# Patient Record
Sex: FEMALE | Race: ASIAN | Employment: FULL TIME | URBAN - METROPOLITAN AREA
[De-identification: names, ages, dates, MRNs, and addresses within clinical notes are randomized per-mention and may not be internally consistent; named-entity substitution may affect disease eponyms.]

---

## 2018-09-02 ENCOUNTER — APPOINTMENT (OUTPATIENT)
Dept: GENERAL RADIOLOGY | Facility: CLINIC | Age: 33
End: 2018-09-02
Attending: EMERGENCY MEDICINE
Payer: COMMERCIAL

## 2018-09-02 ENCOUNTER — HOSPITAL ENCOUNTER (EMERGENCY)
Facility: CLINIC | Age: 33
Discharge: HOME | End: 2018-09-03
Attending: EMERGENCY MEDICINE | Admitting: EMERGENCY MEDICINE
Payer: COMMERCIAL

## 2018-09-02 VITALS
OXYGEN SATURATION: 95 % | DIASTOLIC BLOOD PRESSURE: 82 MMHG | WEIGHT: 210 LBS | TEMPERATURE: 97.5 F | SYSTOLIC BLOOD PRESSURE: 131 MMHG | HEART RATE: 103 BPM | RESPIRATION RATE: 20 BRPM
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
## 2018-09-02 DIAGNOSIS — S80.01XA CONTUSION OF RIGHT KNEE, INITIAL ENCOUNTER: ICD-10-CM

## 2018-09-02 DIAGNOSIS — S93.491A SPRAIN OF ANTERIOR TALOFIBULAR LIGAMENT OF RIGHT ANKLE, INITIAL ENCOUNTER: ICD-10-CM

## 2018-09-02 DIAGNOSIS — S80.02XA CONTUSION OF LEFT KNEE, INITIAL ENCOUNTER: ICD-10-CM

## 2018-09-02 PROCEDURE — 25000132 ZZH RX MED GY IP 250 OP 250 PS 637: Performed by: EMERGENCY MEDICINE

## 2018-09-02 PROCEDURE — 29515 APPLICATION SHORT LEG SPLINT: CPT

## 2018-09-02 PROCEDURE — 73610 X-RAY EXAM OF ANKLE: CPT | Mod: RT

## 2018-09-02 PROCEDURE — 99284 EMERGENCY DEPT VISIT MOD MDM: CPT | Mod: 25

## 2018-09-02 PROCEDURE — 73630 X-RAY EXAM OF FOOT: CPT | Mod: RT

## 2018-09-02 RX ORDER — IBUPROFEN 800 MG/1
800 TABLET, FILM COATED ORAL ONCE
Status: COMPLETED | OUTPATIENT
Start: 2018-09-02 | End: 2018-09-02

## 2018-09-02 RX ORDER — CETIRIZINE HYDROCHLORIDE 10 MG/1
10 TABLET ORAL DAILY
COMMUNITY

## 2018-09-02 RX ADMIN — IBUPROFEN 800 MG: 800 TABLET, FILM COATED ORAL at 23:45

## 2018-09-02 NOTE — ED AVS SNAPSHOT
[facility] Emergency Department    [address]    [facility] [address]    Phone:  [phone]    Fax:  [phone]                                       Velma Gruber   MRN: [id number]    Department:  [facility] Emergency Department   Date of Visit:  9/2/2018           Patient Information     Date Of Birth          1985        Your diagnoses for this visit were:     Fall down steps, initial encounter     Contusion of left knee, initial encounter     Contusion of right knee, initial encounter     Sprain of anterior talofibular ligament of right ankle, initial encounter        You were seen by Giram Ayoub MD.      Follow-up Information     Follow up with [facility], [facility].    Why:  As needed    Contact information:    [address] [facility] [address]  [phone]          Discharge Instructions       Discharge Instructions  Ankle Sprain    An ankle sprain is a stretching or tearing of a ligament around your ankle joint. In most cases, we recommend resting the ankle for about 3 days, followed by return to activity. Some severe sprains need longer periods of rest, or can require a cast or boot to immobilize them.    Generally, every Emergency Department visit should have a follow-up clinic visit with either a primary or a specialty clinic/provider. Please follow-up as instructed by your emergency provider today.    Return to the Emergency Department if:    Your pain is much worse, or if there is pain in a new area.    Your foot or leg becomes pale, cool, blue, or numb or tingling.    There is anything concerning to you about how your ankle looks.    Any splint or device is feeling too tight, causing pain, or rubbing into your skin.    Follow-up with your provider:    As recommended by your emergency provider.    If your ankle is not back to normal within about 1 week.    If you are involved in significant athletic activities.         Treatment:    Apply ice your injured area for 15 minutes at a time, at least 3 times a day for the first 1-2 days. Use a cloth between the ice bag and your skin to prevent frostbite.     Do not sleep with an ice pack or heating pad on, since this can cause burns or skin injury.    Raise the injured area above the level of your heart as much as possible in the first 1-2 days.    Pain medications -- You may take a pain medication such as Tylenol  (acetaminophen), Advil , Nuprin  (ibuprofen) or Aleve  (naproxen).    Splint. We often give a stirrup-shaped ankle splint to support your ankle and prevent it from turning again. Wear this all the time for the first 3-5 days, and then as directed by your provider.    Crutches. If you cannot put weight on the ankle without a lot of pain, we recommend crutches. You can put as much weight on the ankle as possible without severe pain.     Compression. An elastic bandage (Ace  wrap) can help with pain and swelling. Remove this at least twice a day, and leave it off for several hours if you develop swelling of the foot.     Exercises.  Movements, like rotating the foot in circles, should be started when swelling improves.   If you were given a prescription for medicine here today, be sure to read all of the information (including the package insert) that comes with your prescription.  This will include important information about the medicine, its side effects, and any warnings that you need to know about.  The pharmacist who fills the prescription can provide more information and answer questions you may have about the medicine.  If you have questions or concerns that the pharmacist cannot address, please call or return to the Emergency Department.  Remember that you can always come back to the Emergency Department if you are not able to see your regular provider in the amount of time listed above, if you get any new symptoms, or if there is anything that worries you.      24  Hour Appointment Hotline       To make an appointment at any [facility], call [phone] ([phone]). If you don't have a family doctor or clinic, we will help you find one. [facility] clinics are conveniently located to serve the needs of you and your family.             Review of your medicines      Our records show that you are taking the medicines listed below. If these are incorrect, please call your family doctor or clinic.        Dose / Directions Last dose taken    CELEBREX PO   Dose:  50 mg        Take 50 mg by mouth daily   Refills:  0        cetirizine 10 MG tablet   Commonly known as:  zyrTEC   Dose:  10 mg        Take 10 mg by mouth daily   Refills:  0        SYNTHROID PO   Dose:  37.5 mcg        Take 37.5 mcg by mouth daily   Refills:  0                Procedures and tests performed during your visit     Ankle XR, G/E 3 views, right    Foot  XR, G/E 3 views, right    XR Knee Bilateral 1/2 Views      Orders Needing Specimen Collection     None      Pending Results     Date and Time Order Name Status Description    9/2/2018 2340 Ankle XR, G/E 3 views, right Preliminary             Pending Culture Results     No orders found for last 3 day(s).            Pending Results Instructions     If you had any lab results that were not finalized at the time of your Discharge, you can call the ED Lab Result RN at [phone]. You will be contacted by this team for any positive Lab results or changes in treatment. The nurses are available 7 days a week from 10A to 6:30P.  You can leave a message 24 hours per day and they will return your call.        Test Results From Your Hospital Stay        9/3/2018 12:48 AM      Narrative     XR KNEE BILATERAL 1-2 VIEWS   9/3/2018 12:03 AM     INDICATION: Fall.    COMPARISON: None.        Impression     IMPRESSION: No fracture, dislocation or other acute findings.    YAZMIN CAREY MD         9/3/2018 12:19 AM      Narrative     XR ANKLE RIGHT GREATER THAN 3  VIEWS   9/3/2018 12:03 AM     INDICATION: Fall.    COMPARISON: None.        Impression     IMPRESSION: Soft tissue swelling about the ankle. No acute fracture or  dislocation.         9/3/2018 12:48 AM      Narrative     XR FOOT RIGHT GREATER THAN 3 VIEWS   9/3/2018 12:03 AM     INDICATION: Fall.    COMPARISON: None.        Impression     IMPRESSION: No fracture or other acute findings.    YAZMIN CAREY MD                Clinical Quality Measure: Blood Pressure Screening     Your blood pressure was checked while you were in the emergency department today. The last reading we obtained was  BP: 131/82 . Please read the guidelines below about what these numbers mean and what you should do about them.  If your systolic blood pressure (the top number) is less than 120 and your diastolic blood pressure (the bottom number) is less than 80, then your blood pressure is normal. There is nothing more that you need to do about it.  If your systolic blood pressure (the top number) is 120-139 or your diastolic blood pressure (the bottom number) is 80-89, your blood pressure may be higher than it should be. You should have your blood pressure rechecked within a year by a primary care provider.  If your systolic blood pressure (the top number) is 140 or greater or your diastolic blood pressure (the bottom number) is 90 or greater, you may have high blood pressure. High blood pressure is treatable, but if left untreated over time it can put you at risk for heart attack, stroke, or kidney failure. You should have your blood pressure rechecked by a primary care provider within the next 4 weeks.  If your provider in the emergency department today gave you specific instructions to follow-up with your doctor or provider even sooner than that, you should follow that instruction and not wait for up to 4 weeks for your follow-up visit.        Thank you for choosing [facility]       Thank you for choosing [facility] for your care. Our goal  "is always to provide you with excellent care. Hearing back from our patients is one way we can continue to improve our services. Please take a few minutes to complete the written survey that you may receive in the mail after you visit with us. Thank you!        [employer] Information     [employer] lets you send messages to your doctor, view your test results, renew your prescriptions, schedule appointments and more. To sign up, go to [email or web address] . Click on \"Log in\" on the left side of the screen, which will take you to the Welcome page. Then click on \"Sign up Now\" on the right side of the page.     You will be asked to enter the access code listed below, as well as some personal information. Please follow the directions to create your username and password.     Your access code is: [email or web address]  Expires: 2018 12:33 AM     Your access code will  in 90 days. If you need help or a new code, please call your [facility] clinic or [phone].        Care EveryWhere ID     This is your Care EveryWhere ID. This could be used by other organizations to access your [facility] medical records  [id number]        Equal Access to Services     DANIELA BARRETT : Marilu Manley, waagustin wilcox, zuly carvalho, sandra phillips. So [facility] [phone].    ATENCIÓN: Si habla español, tiene a yuen disposición servicios gratuitos de asistencia lingüística. Ross al [phone].    We comply with applicable federal civil rights laws and [place] laws. We do not discriminate on the basis of race, color, national origin, age, disability, sex, sexual orientation, or gender identity.            After Visit Summary       This is your record. Keep this with you and show to your community pharmacist(s) and doctor(s) at your next visit.                  "

## 2018-09-02 NOTE — ED AVS SNAPSHOT
[facility] Emergency Department    [address]    [facility] [address]    Phone:  [phone]    Fax:  [phone]                                       Velma Gruber   MRN: [id number]    Department:  [facility] Emergency Department   Date of Visit:  9/2/2018           After Visit Summary Signature Page     I have received my discharge instructions, and my questions have been answered. I have discussed any challenges I see with this plan with the nurse or doctor.    ..........................................................................................................................................  Patient/Patient Representative Signature      ..........................................................................................................................................  Patient Representative Print Name and Relationship to Patient    ..................................................               ................................................  Date                                            Time    ..........................................................................................................................................  Reviewed by Signature/Title    ...................................................              ..............................................  Date                                                            Time          22EPIC Rev 08/18

## 2018-09-03 ENCOUNTER — APPOINTMENT (OUTPATIENT)
Dept: GENERAL RADIOLOGY | Facility: CLINIC | Age: 33
End: 2018-09-03
Attending: EMERGENCY MEDICINE
Payer: COMMERCIAL

## 2018-09-03 PROCEDURE — 73560 X-RAY EXAM OF KNEE 1 OR 2: CPT | Mod: 50

## 2018-09-03 NOTE — ED PROVIDER NOTES
Visit Date:   09/02/2018      CHIEF COMPLAINT:  Fall down the steps.      HISTORY OF PRESENT ILLNESS:  This is a pleasant 33-year-old female who presents after slipping on the steps.  She landed twisting her right ankle inward as well as landing directly on both knees.  She sustained no other trauma or head injury.  She has not been able to weightbear since the time of the injury.  This occurred just prior to arrival.      PAST MEDICAL HISTORY:   1.  Anxiety.   2.  Asthma.   3.  Environmental allergies.   4.  History of ovarian cyst.   5.  Thyroid cancer.      PAST SURGICAL HISTORY:  Thyroidectomy.      MEDICATIONS:     1.  Synthroid.   2.  Celexa.   3.  Zyrtec.      ALLERGIES:  None.      SOCIAL HISTORY:  The patient presents with her significant other.  She does not smoke.      REVIEW OF SYSTEMS:  A pertinent 10-point review of systems is negative except for that noted in the HPI.      PHYSICAL EXAMINATION:   GENERAL:  The patient is sitting up comfortably in bed with an ice pack on her right ankle.   VITAL SIGNS:  Blood pressure 131/82, heart rate 103, respiratory rate 20, oxygen 95% on room air, temperature 97.5.   HEENT:  Atraumatic.   NECK:  The patient is freely moving her neck.   CARDIOVASCULAR:  Regular rhythm.  Normal rate.  No murmurs.  Two plus DP pulse on the right foot.   RESPIRATORY:  Clear to auscultation bilaterally.  No increased work of breathing.   GASTROINTESTINAL:  Soft, nontender.   MUSCULOSKELETAL:  Full range of motion of the left lower extremity and bilateral upper extremities.  Full range of motion of the right hip and right knee.  She has large soft tissue swelling over the lateral malleolus and pain with any range of motion of the right ankle.  There is tenderness in the mid foot.   SKIN:  The patient has contusions to both anterior knees.  Otherwise, no pertinent skin findings.   NEUROLOGIC:  The patient is alert, oriented x4.  GCS is 15.  Normal strength and sensation in all  extremities.   PSYCHIATRIC:  The patient has a normal mood and affect.      LABORATORY  EVALUATION AND DIAGNOSTIC STUDIES:    Plain x-rays of the bilateral knees, right foot and right ankle are negative for any acute fracture or bony injury.  There is soft tissue swelling noted about the ankle laterally.  No dislocation.      EMERGENCY DEPARTMENT COURSE AND MEDICAL DECISION MAKING:  This is a 33-year-old female who presents after a fall as described in the HPI.  She has an inversion injury of her right ankle consistent with lateral sprain.  No radiographic evidence of fracture or dislocation.  No other findings by history or exam that would necessitate further traumatic workup.      Plan of care will be supportive with rest, ice, ibuprofen, gel splinting and crutches used with appropriate teaching to the right ankle until she is able to weightbear without pain.  The patient is understanding of this and will be discharged home.      DIAGNOSES:     1.  Fall down steps   2.  Bilateral knee contusions.   3.  Right ankle sprain, acute.      PLAN OF CARE:  Discharge home.         MOSHE KOHLI MD             D: 2018   T: 2018   MT: NAV      Name:     [facility]   MRN:      [id number]        Account:      [id number]   :      1985           Visit Date:   2018      Document: [id number]

## 2018-09-03 NOTE — ED TRIAGE NOTES
Pt reports falling down three steps after missing a step and landing on her knees, c/o R ankle pain, swelling noted. Pt endorses hitting head, but denies headache. Not on thinners. ABCs intact, A/O x4.

## 2018-10-19 ENCOUNTER — THERAPY VISIT (OUTPATIENT)
Dept: PHYSICAL THERAPY | Facility: CLINIC | Age: 33
End: 2018-10-19
Payer: COMMERCIAL

## 2018-10-19 DIAGNOSIS — M25.571 PAIN IN JOINT INVOLVING ANKLE AND FOOT, RIGHT: Primary | ICD-10-CM

## 2018-10-19 PROCEDURE — 97010 HOT OR COLD PACKS THERAPY: CPT | Mod: GP | Performed by: PHYSICAL THERAPIST

## 2018-10-19 PROCEDURE — 97110 THERAPEUTIC EXERCISES: CPT | Mod: GP | Performed by: PHYSICAL THERAPIST

## 2018-10-19 PROCEDURE — 97161 PT EVAL LOW COMPLEX 20 MIN: CPT | Mod: GP | Performed by: PHYSICAL THERAPIST

## 2018-10-19 NOTE — PROGRESS NOTES
[facility] Initial Evaluation  Subjective:  Patient is a 33 year old female presenting with rehab right ankle/foot hpi. The history is provided by the patient.   Velma Gruber is a 33 year old female with a right ankle (right ankle instability, swelling and pain) condition.      This is a new condition  Patient has chief complaint of right ankle pain, instability and swelling after she fell on the stairs on September 2, 2018. She felt a sudden pop and had immediate swelling and pain. She had an xray which showed no fractures.Due to continued swelling, pain and instability she returned to [facility] for follow up a few weeks later. She had another xray which was negative for fracture. History of right ankle sprain in high school.  Goals: improved stability; return to walk/run program pain free.    Patient reports pain:  Lateral and joint.  Radiates to:  No radiation.  Pain is described as aching and stabbing and is intermittent and reported as 1/10.  Associated symptoms:  Buckling/giving out, edema, loss of motion/stiffness and loss of strength. Pain is worse in the P.M..  Symptoms are exacerbated by walking, activity, ascending stairs, bending/squatting, descending stairs and standing and relieved by ice.  Since onset symptoms are gradually improving.  Special tests:  X-ray (negative).  Previous treatment includes chiropractic.  There was mild improvement following previous treatment.  General health as reported by patient is fair.  Pertinent medical history includes:  Overweight, thyroid problems, migraines/headaches, concussions/dizziness and other (Hoshimoto disease).    Other surgeries include:  Other (Thyroidectomy 4/2017).  Current medications:  Hormone replacement therapy, thyroid medication, sleep medication and anti-inflammatory (Celebrex;allergy meds).  Current occupation -[employer].  Patient is working in normal job without restrictions.  Primary job tasks include:  Prolonged  sitting.    Barriers include:  None as reported by patient.    Red flags:  None as reported by patient.                        Objective:    Gait:    Gait Type:  Normal   Assistive Devices:  None            Ankle/Foot Evaluation  ROM:    AROM:    Dorsiflexion: Left:    Right:   3  Plantarflexion: Left:     Right:  55  Inversion: Left:      Right:  40  Eversion:     Right:  30  Great toe flexion: Left:      Right:  WNL  Great Toe Extension: Left:      Right: WNL    Strength:    Dorsiflexion:  Right: 4+/5    Pain:-  Plantarflexion: Right: 3/5   Pain:++  Inversion:Right: 4/5   Pain:-/+  Eversion:Right: 4/5   Pain:-/+                  LIGAMENT TESTING:         Valgus Stress (Deltoid) Right: Gr II  Rotation (Deltoid) Right: Gr II  External Rotation (High Ankle) Right: neg    PALPATION:     Right ankle tenderness present at:   deltoid ligament and lateral malleolus  Right ankle tenderness not present at:  achilles tendon or plantar fascia  EDEMA: Edema ankle: moderate right ankle.                                                              General     ROS    Assessment/Plan:    Patient is a 33 year old female with right side ankle complaints.    Patient has the following significant findings with corresponding treatment plan.                Diagnosis 1:  Right ankle sprain  Pain -  hot/cold therapy and education  Decreased ROM/flexibility - manual therapy, therapeutic exercise and home program  Decreased strength - therapeutic exercise, therapeutic activities and home program  Edema - cryocuff and self management/home program  Instability -  Therapeutic Activity  Therapeutic Exercise  home program    Therapy Evaluation Codes:   1) History comprised of:   Personal factors that impact the plan of care:      None.    Comorbidity factors that impact the plan of care are:      Hoshimotos disease with joint pain and None.     Medications impacting care: None.  2) Examination of Body Systems comprised of:   Body structures and  functions that impact the plan of care:      Ankle.   Activity limitations that impact the plan of care are:      Running, Squatting/kneeling, Stairs and Walking.  3) Clinical presentation characteristics are:   Stable/Uncomplicated.  4) Decision-Making    Low complexity using standardized patient assessment instrument and/or measureable assessment of functional outcome.  Cumulative Therapy Evaluation is: Low complexity.    Previous and current functional limitations:  (See Goal Flow Sheet for this information)    Short term and Long term goals: (See Goal Flow Sheet for this information)     Communication ability:  Patient appears to be able to clearly communicate and understand verbal and written communication and follow directions correctly.  Treatment Explanation - The following has been discussed with the patient:   RX ordered/plan of care  Anticipated outcomes  Possible risks and side effects  This patient would benefit from PT intervention to resume normal activities.   Rehab potential is excellent.    Frequency:  1 X week, once daily  Duration:  for 6 weeks  Discharge Plan:  Achieve all LTG.  Independent in home treatment program.  Reach maximal therapeutic benefit.    Please refer to the daily flowsheet for treatment today, total treatment time and time spent performing 1:1 timed codes.

## 2018-10-19 NOTE — MR AVS SNAPSHOT
"              After Visit Summary   10/19/2018    Velma Gruber    MRN: [id number]           Patient Information     Date Of Birth          1985        Visit Information        Provider Department      10/19/2018 7:30 AM Kristina Scherer PT [facility] [place]        Today's Diagnoses     Pain in joint involving ankle and foot, right    -  1       Follow-ups after your visit        Your next 10 appointments already scheduled     Oct 23, 2018  9:00 AM CDT   MICHELET Extremity with Davis Yanes PT   [facility] [place] (MICHE[facility] [place])    [address] [facility] [address] [phone]              Who to contact     If you have questions or need follow up information about today's clinic visit or your schedule please contact [facility] directly at [phone].  Normal or non-critical lab and imaging results will be communicated to you by MyChart, letter or phone within 4 business days after the clinic has received the results. If you do not hear from us within 7 days, please contact the clinic through MyChart or phone. If you have a critical or abnormal lab result, we will notify you by phone as soon as possible.  Submit refill requests through [employer] or call your pharmacy and they will forward the refill request to us. Please allow 3 business days for your refill to be completed.          Additional Information About Your Visit        MyChart Information     [email or web address] lets you send messages to your doctor, view your test results, renew your prescriptions, schedule appointments and more. To sign up, go to [email or web address] . Click on \"Log in\" on the left side of the screen, which will take you to the Welcome page. Then click on \"Sign up Now\" on the right side of the page.     You will be asked to enter the access code listed below, as well as some personal information. Please follow the directions to create your username and " password.     Your access code is: [email or web address]  Expires: 2018 12:33 AM     Your access code will  in 90 days. If you need help or a new code, please call your [facility] or [phone].        Care EveryWhere ID     This is your Care EveryWhere ID. This could be used by other organizations to access your [facility] medical records  [id number]         Blood Pressure from Last 3 Encounters:   18 131/82   10/04/14 109/74    Weight from Last 3 Encounters:   18 95.3 kg (210 lb)              We Performed the Following     HC PT EVAL, LOW COMPLEXITY     HOT OR COLD PACKS THERAPY     MICHELET INITIAL EVAL REPORT     THERAPEUTIC EXERCISES        Primary Care Provider Office Phone # Fax #    [facility] [phone] [address] [facility] [address]        Equal Access to Services     DANIELA BARRETT : Hadii aad ku hadasho Soomaali, waaxda luqadaha, qaybta kaalmada adeegyada, waxay idiin hayaan adeeg kharagio lylen . So [facility] [phone].    ATENCIÓN: Si habla español, tiene a yuen disposición servicios gratuitos de asistencia lingüística. Ross al [phone].    We comply with applicable federal civil rights laws and [place] laws. We do not discriminate on the basis of race, color, national origin, age, disability, sex, sexual orientation, or gender identity.            Thank you!     [facility]. Our goal is always to provide you with excellent care. Hearing back from our patients is one way we can continue to improve our services. Please take a few minutes to complete the written survey that you may receive in the mail after your visit with us. Thank you!             Your Updated Medication List - Protect others around you: Learn how to safely use, store and throw away your medicines at www.disposemymeds.org.          This list is accurate as of 10/19/18  9:09 AM.  Always use your most  recent med list.                   Brand Name Dispense Instructions for use Diagnosis    CELEBREX PO      Take 50 mg by mouth daily        cetirizine 10 MG tablet    zyrTEC     Take 10 mg by mouth daily        SYNTHROID PO      Take 37.5 mcg by mouth daily

## 2018-10-23 ENCOUNTER — THERAPY VISIT (OUTPATIENT)
Dept: PHYSICAL THERAPY | Facility: CLINIC | Age: 33
End: 2018-10-23
Payer: COMMERCIAL

## 2018-10-23 DIAGNOSIS — M25.571 PAIN IN JOINT INVOLVING ANKLE AND FOOT, RIGHT: ICD-10-CM

## 2018-10-23 PROCEDURE — 97110 THERAPEUTIC EXERCISES: CPT | Mod: GP | Performed by: PHYSICAL THERAPIST

## 2018-10-23 PROCEDURE — 97016 VASOPNEUMATIC DEVICE THERAPY: CPT | Mod: GP | Performed by: PHYSICAL THERAPIST

## 2018-10-23 PROCEDURE — 97140 MANUAL THERAPY 1/> REGIONS: CPT | Mod: GP | Performed by: PHYSICAL THERAPIST

## 2018-11-01 ENCOUNTER — THERAPY VISIT (OUTPATIENT)
Dept: PHYSICAL THERAPY | Facility: CLINIC | Age: 33
End: 2018-11-01
Payer: COMMERCIAL

## 2018-11-01 DIAGNOSIS — M25.571 PAIN IN JOINT INVOLVING ANKLE AND FOOT, RIGHT: ICD-10-CM

## 2018-11-01 PROCEDURE — 97110 THERAPEUTIC EXERCISES: CPT | Mod: GP | Performed by: PHYSICAL THERAPIST

## 2018-11-01 PROCEDURE — 97140 MANUAL THERAPY 1/> REGIONS: CPT | Mod: GP | Performed by: PHYSICAL THERAPIST

## 2018-11-01 PROCEDURE — 97016 VASOPNEUMATIC DEVICE THERAPY: CPT | Mod: GP | Performed by: PHYSICAL THERAPIST

## 2018-11-08 ENCOUNTER — THERAPY VISIT (OUTPATIENT)
Dept: PHYSICAL THERAPY | Facility: CLINIC | Age: 33
End: 2018-11-08
Payer: COMMERCIAL

## 2018-11-08 DIAGNOSIS — M25.571 PAIN IN JOINT INVOLVING ANKLE AND FOOT, RIGHT: ICD-10-CM

## 2018-11-08 PROCEDURE — 97016 VASOPNEUMATIC DEVICE THERAPY: CPT | Mod: GP | Performed by: PHYSICAL THERAPIST

## 2018-11-08 PROCEDURE — 97110 THERAPEUTIC EXERCISES: CPT | Mod: GP | Performed by: PHYSICAL THERAPIST

## 2018-11-08 PROCEDURE — 97140 MANUAL THERAPY 1/> REGIONS: CPT | Mod: GP | Performed by: PHYSICAL THERAPIST

## 2018-11-11 ENCOUNTER — HEALTH MAINTENANCE LETTER (OUTPATIENT)
Age: 33
End: 2018-11-11

## 2018-11-20 ENCOUNTER — THERAPY VISIT (OUTPATIENT)
Dept: PHYSICAL THERAPY | Facility: CLINIC | Age: 33
End: 2018-11-20
Payer: COMMERCIAL

## 2018-11-20 DIAGNOSIS — M25.571 PAIN IN JOINT INVOLVING ANKLE AND FOOT, RIGHT: ICD-10-CM

## 2018-11-20 PROCEDURE — 97110 THERAPEUTIC EXERCISES: CPT | Mod: GP | Performed by: PHYSICAL THERAPIST

## 2018-11-20 PROCEDURE — 97140 MANUAL THERAPY 1/> REGIONS: CPT | Mod: GP | Performed by: PHYSICAL THERAPIST

## 2018-12-06 ENCOUNTER — THERAPY VISIT (OUTPATIENT)
Dept: PHYSICAL THERAPY | Facility: CLINIC | Age: 33
End: 2018-12-06
Payer: COMMERCIAL

## 2018-12-06 DIAGNOSIS — M25.571 PAIN IN JOINT INVOLVING ANKLE AND FOOT, RIGHT: ICD-10-CM

## 2018-12-06 PROCEDURE — 97110 THERAPEUTIC EXERCISES: CPT | Mod: GP | Performed by: PHYSICAL THERAPIST

## 2018-12-06 PROCEDURE — 97140 MANUAL THERAPY 1/> REGIONS: CPT | Mod: GP | Performed by: PHYSICAL THERAPIST

## 2018-12-27 ENCOUNTER — THERAPY VISIT (OUTPATIENT)
Dept: PHYSICAL THERAPY | Facility: CLINIC | Age: 33
End: 2018-12-27
Payer: COMMERCIAL

## 2018-12-27 DIAGNOSIS — M25.571 PAIN IN JOINT INVOLVING ANKLE AND FOOT, RIGHT: ICD-10-CM

## 2018-12-27 PROCEDURE — 97112 NEUROMUSCULAR REEDUCATION: CPT | Mod: GP | Performed by: PHYSICAL THERAPIST

## 2018-12-27 PROCEDURE — 97110 THERAPEUTIC EXERCISES: CPT | Mod: GP | Performed by: PHYSICAL THERAPIST

## 2019-11-03 ENCOUNTER — HEALTH MAINTENANCE LETTER (OUTPATIENT)
Age: 34
End: 2019-11-03

## 2020-11-16 ENCOUNTER — HEALTH MAINTENANCE LETTER (OUTPATIENT)
Age: 35
End: 2020-11-16

## 2021-06-02 ENCOUNTER — RECORDS - HEALTHEAST (OUTPATIENT)
Dept: ADMINISTRATIVE | Facility: CLINIC | Age: 36
End: 2021-06-02

## 2021-09-18 ENCOUNTER — HEALTH MAINTENANCE LETTER (OUTPATIENT)
Age: 36
End: 2021-09-18

## 2022-01-08 ENCOUNTER — HEALTH MAINTENANCE LETTER (OUTPATIENT)
Age: 37
End: 2022-01-08

## 2022-11-19 ENCOUNTER — HEALTH MAINTENANCE LETTER (OUTPATIENT)
Age: 37
End: 2022-11-19

## 2023-04-15 ENCOUNTER — HEALTH MAINTENANCE LETTER (OUTPATIENT)
Age: 38
End: 2023-04-15

## 2024-04-02 ENCOUNTER — APPOINTMENT (OUTPATIENT)
Dept: URBAN - METROPOLITAN AREA CLINIC 255 | Age: 39
Setting detail: DERMATOLOGY
End: 2024-04-02

## 2024-04-02 DIAGNOSIS — L60.8 OTHER NAIL DISORDERS: ICD-10-CM

## 2024-04-02 PROBLEM — D23.39 OTHER BENIGN NEOPLASM OF SKIN OF OTHER PARTS OF FACE: Status: ACTIVE | Noted: 2024-04-02

## 2024-04-02 PROCEDURE — OTHER MIPS QUALITY: OTHER

## 2024-04-02 PROCEDURE — OTHER COUNSELING: OTHER

## 2024-04-02 PROCEDURE — OTHER DEFER: OTHER

## 2024-04-02 PROCEDURE — 99203 OFFICE O/P NEW LOW 30 MIN: CPT

## 2024-04-02 ASSESSMENT — LOCATION DETAILED DESCRIPTION DERM: LOCATION DETAILED: LEFT THUMBNAIL

## 2024-04-02 ASSESSMENT — LOCATION SIMPLE DESCRIPTION DERM: LOCATION SIMPLE: LEFT THUMBNAIL

## 2024-04-02 ASSESSMENT — LOCATION ZONE DERM: LOCATION ZONE: FINGERNAIL

## 2024-04-02 NOTE — PROCEDURE: COUNSELING
Detail Level: Detailed
Patient Specific Counseling (Will Not Stick From Patient To Patient): Discussed risks and benefits of performing a biopsy today.\\n\\nPlan to recheck nail in 6 months.

## 2024-04-02 NOTE — PROCEDURE: DEFER
Other Procedure: resurfacing laser (byorn) vs Surgical excision +Recon
Introduction Text (Please End With A Colon): The following procedure was deferred:
Size Of Lesion In Cm (Optional): 0
Procedure To Be Performed At Next Visit: Treatment
Detail Level: Detailed

## 2024-06-16 ENCOUNTER — HEALTH MAINTENANCE LETTER (OUTPATIENT)
Age: 39
End: 2024-06-16

## 2024-10-15 ENCOUNTER — APPOINTMENT (OUTPATIENT)
Dept: URBAN - METROPOLITAN AREA CLINIC 255 | Age: 39
Setting detail: DERMATOLOGY
End: 2024-10-28

## 2024-10-15 DIAGNOSIS — L60.8 OTHER NAIL DISORDERS: ICD-10-CM

## 2024-10-15 PROCEDURE — 99213 OFFICE O/P EST LOW 20 MIN: CPT

## 2024-10-15 PROCEDURE — OTHER COUNSELING: OTHER

## 2024-10-15 PROCEDURE — OTHER MIPS QUALITY: OTHER

## 2024-10-15 ASSESSMENT — LOCATION SIMPLE DESCRIPTION DERM: LOCATION SIMPLE: LEFT THUMBNAIL

## 2024-10-15 ASSESSMENT — LOCATION DETAILED DESCRIPTION DERM: LOCATION DETAILED: LEFT THUMBNAIL

## 2024-10-15 ASSESSMENT — LOCATION ZONE DERM: LOCATION ZONE: FINGERNAIL

## 2024-10-15 NOTE — PROCEDURE: COUNSELING
Patient Specific Counseling (Will Not Stick From Patient To Patient): Plan to recheck nail annually.\\n\\nContact office for sooner appointment if changes are noticed in size or color.
Detail Level: Detailed